# Patient Record
Sex: FEMALE | Race: OTHER | HISPANIC OR LATINO | Employment: FULL TIME | URBAN - METROPOLITAN AREA
[De-identification: names, ages, dates, MRNs, and addresses within clinical notes are randomized per-mention and may not be internally consistent; named-entity substitution may affect disease eponyms.]

---

## 2017-05-29 PROBLEM — F32.A ANXIETY AND DEPRESSION: Status: ACTIVE | Noted: 2017-05-29

## 2017-05-29 PROBLEM — F41.9 ANXIETY AND DEPRESSION: Status: ACTIVE | Noted: 2017-05-29

## 2017-05-29 PROBLEM — E03.9 HYPOTHYROIDISM (ACQUIRED): Status: ACTIVE | Noted: 2017-05-29

## 2017-07-20 PROBLEM — I10 ESSENTIAL HYPERTENSION: Status: ACTIVE | Noted: 2017-07-20

## 2019-08-28 PROBLEM — K80.20 CALCULUS OF GALLBLADDER WITHOUT CHOLECYSTITIS WITHOUT OBSTRUCTION: Status: ACTIVE | Noted: 2019-08-28

## 2019-09-12 PROBLEM — K80.20 SYMPTOMATIC CHOLELITHIASIS: Status: ACTIVE | Noted: 2019-09-12

## 2019-09-26 PROBLEM — K80.20 SYMPTOMATIC CHOLELITHIASIS: Status: RESOLVED | Noted: 2019-09-12 | Resolved: 2019-09-26

## 2019-09-26 PROBLEM — K80.20 CALCULUS OF GALLBLADDER WITHOUT CHOLECYSTITIS WITHOUT OBSTRUCTION: Status: RESOLVED | Noted: 2019-08-28 | Resolved: 2019-09-26

## 2020-03-12 ENCOUNTER — CLINICAL SUPPORT (OUTPATIENT)
Dept: REHABILITATION | Facility: HOSPITAL | Age: 40
End: 2020-03-12
Payer: COMMERCIAL

## 2020-03-12 DIAGNOSIS — M54.9 BACK PAIN, UNSPECIFIED BACK LOCATION, UNSPECIFIED BACK PAIN LATERALITY, UNSPECIFIED CHRONICITY: ICD-10-CM

## 2020-03-12 DIAGNOSIS — R26.89 IMPAIRED GAIT AND MOBILITY: ICD-10-CM

## 2020-03-12 DIAGNOSIS — M53.82 IMPAIRED RANGE OF MOTION OF CERVICAL SPINE: ICD-10-CM

## 2020-03-12 DIAGNOSIS — M54.2 NECK PAIN: ICD-10-CM

## 2020-03-12 PROCEDURE — 97110 THERAPEUTIC EXERCISES: CPT | Mod: PO | Performed by: PHYSICAL THERAPIST

## 2020-03-12 PROCEDURE — 97161 PT EVAL LOW COMPLEX 20 MIN: CPT | Mod: PO | Performed by: PHYSICAL THERAPIST

## 2020-03-12 NOTE — PLAN OF CARE
[facility]  Physical Therapy Initial Evaluation    Name: Caro Torres  Clinic Number: [id number]    Therapy Diagnosis:   Encounter Diagnoses   Name Primary?    Neck pain     Back pain, unspecified back location, unspecified back pain laterality, unspecified chronicity     Impaired gait and mobility     Impaired range of motion of cervical spine      Physician: Marta Navarro MD    Physician Orders:Eval and treat  Medical Diagnosis from Referral:   M54.2 (ICD-10-CM) - Neck pain   M54.9 (ICD-10-CM) - Back pain, unspecified back location, unspecified back pain laterality, unspecified chronicity     Evaluation Date: 3/12/2020  Authorization Period Expiration: 12/31/2020  Plan of Care Expiration: 04/22/2020  Visit # / Visits authorized:1/ 20    Time In: 1405  Time Out: 1455  Total Billable Time: 40 minutes    Precautions: Standard and Immunosuppression    Subjective   Date of onset: 6 mo  History of current condition - Juanita reports: chronic with acute flare ups of neck and R hip pain. She does report wt gain. 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 She has trouble walking after sitting for a while, neck pain L>R, L hand dominant.     Medical History:   Past Medical History:   Diagnosis Date    Anxiety     Gall stone     Hypertension     Hypothyroidism        Surgical History:   Caro Torres  has a past surgical history that includes Chavies tooth extraction (2007) and Laparoscopic cholecystectomy (N/A, 9/12/2019).    Medications:   Caro has a current medication list which includes the following prescription(s): atenolol, bupropion, fluticasone propionate, hydrochlorothiazide, and levothyroxine, and the following Facility-Administered Medications: lidocaine (pf) 10 mg/ml (1%).    Allergies:   Review of patient's allergies indicates:  No Known Allergies     Imaging, none:     Prior Therapy: none  Social History: lives with their family  Occupation:   "manager  Prior Level of Function: Always with tension pain in upper body, hip pain is new  Current Level of Function: She has trouble walking after sitting for a while, neck pain L>R, L hand dominant.    Pain:  Current 4/10, worst 6/10, best 2/10   Location: bilateral neck and R hip   Description: Aching  Aggravating Factors: Sitting, Standing and Walking  Easing Factors: rest    Pts goals: dec pain and learn home management, body awareness/mechanics with work    Objective     Observation: FHP, RS    Posture: fair-good    Cervical Range of Motion:    Degrees Pain   Flexion 34 Pain L shoulder, mid trap area     Extension 48 No complaints     Right Rotation 57 No complaint     Left Rotation 61 painful     Right Side Bending 32 Tight, uncomfortable   Left Side Bending 32 Tight, uncomfortable      Shoulder Range of Motion:   Shoulder Left Right   Flexion WNL WNL   Abduction WNL WNL   ER WNL WNL   IR WNL WNL     Upper Extremity Strength  (R) UE  (L) UE    Shoulder flexion: 5/5 Shoulder flexion: 4/5   Shoulder Abduction: 5/5 Shoulder abduction: 4/5   Shoulder ER 5/5 Shoulder ER 4/5   Shoulder IR 5/5 Shoulder IR 4/5   Lower Trap 4+/5 Lower Trap 4/5   Middle Trap 5/5 Middle Trap 4/5   Rhomboids 4+/5 Rhomboids 4/5     Special Tests:  Distraction Pos for better no worse   Compression worse   Spurlings worse     Palpation: POS FOR TIGHTNESS TO pt and PT    Sensation: intact B UEs/LEs      CMS Impairment/Limitation/Restriction for FOTO  NECK Survey    Therapist reviewed FOTO scores for Caro Torres on 3/12/2020.   FOTO documents entered into [employer] - see Media section.    Limitation Score: 36%         TREATMENT   Treatment Time In: 1430  Treatment Time Out: 1438  Total Treatment time separate from Evaluation: 08 minutes    Juanita received therapeutic exercises to develop for  minutes including:    UT stretch 3x30" B  Slouch over correct 2x10  Chin tuck 2x10    Home Exercises and Patient Education Provided    Education " provided:   - HEP  - Pt/family was provided educational information, including: role of PT, role of pt/caregiver, goals for PT, POC, scheduling, and attendance policy.- pt verbalized understanding.    Written Home Exercises Provided: yes.  Exercises were reviewed and Juanita was able to demonstrate them prior to the end of the session.  Juanita demonstrated good  understanding of the education provided.     See EMR under Patient Instructions for exercises provided 3/12/2020.    Assessment   Caro is a 39 y.o. female referred to outpatient Physical Therapy with a medical diagnosis of   M54.2 (ICD-10-CM) - Neck pain   M54.9 (ICD-10-CM) - Back pain, unspecified back location, unspecified back pain laterality, unspecified chronicity   . Pt presents with forward head, RS posture with fair-good posture. Limited cervical AROM with R rot and B lat flex is noted. Impaired UE L>R strength is also noted. (Pt is L hand dominant.)    Pt prognosis is Good.   Pt will benefit from skilled outpatient Physical Therapy to address the deficits stated above and in the chart below, provide pt/family education, and to maximize pt's level of independence.     Plan of care discussed with patient: Yes  Pt's spiritual, cultural and educational needs considered and patient is agreeable to the plan of care and goals as stated below:     Anticipated Barriers for therapy: none    Medical Necessity is demonstrated by the following  History  Co-morbidities and personal factors that may impact the plan of care Co-morbidities:   anxiety    Personal Factors:   no deficits     low   Examination  Body Structures and Functions, activity limitations and participation restrictions that may impact the plan of care Body Regions:   neck    Body Systems:    ROM  strength  motor control  motor learning    Participation Restrictions:   Looking, head turning, posture deficit    Activity limitations:   Learning and applying knowledge  no deficits    General Tasks  and Commands  no deficits    Communication  no deficits    Mobility  driving (bike, car, motorcycle)    Self care  no deficits    Domestic Life  doing house work (cleaning house, washing dishes, laundry)    Interactions/Relationships  no deficits    Life Areas  no deficits    Community and Social Life  no deficits         moderate   Clinical Presentation stable and uncomplicated low   Decision Making/ Complexity Score: low     Goals:  Short Term Goals: 3 weeks   -Pt will have 60+ deg R AROM cervical rot to improve driving and head turning.  -Improve L shoulder strength to grossly 4+/5.  -Improve FOTO impairment score to 32% for improved QOL.    Long Term Goals: 6  weeks   -Improve L shoulder strength to grossly 5/5.  -Improve R shoulder strength grossly to 5/5.  -Improve FOTO impairment score to 27% for improved QOL.    Plan   Plan of care Certification: 3/12/2020 to 04/22/2020.    Outpatient Physical Therapy 2 times weekly for 6 weeks to include the following interventions: Electrical Stimulation IFC, Manual Therapy, Moist Heat/ Ice, Patient Education, Self Care, Therapeutic Activites and Therapeutic Exercise.     Elizabeth Tolliver, PT

## 2020-03-17 ENCOUNTER — CLINICAL SUPPORT (OUTPATIENT)
Dept: REHABILITATION | Facility: HOSPITAL | Age: 40
End: 2020-03-17
Payer: COMMERCIAL

## 2020-03-17 DIAGNOSIS — M53.82 IMPAIRED RANGE OF MOTION OF CERVICAL SPINE: ICD-10-CM

## 2020-03-17 DIAGNOSIS — R26.89 IMPAIRED GAIT AND MOBILITY: ICD-10-CM

## 2020-03-17 PROCEDURE — 97140 MANUAL THERAPY 1/> REGIONS: CPT | Mod: PO | Performed by: PHYSICAL THERAPIST

## 2020-03-17 PROCEDURE — 97110 THERAPEUTIC EXERCISES: CPT | Mod: PO | Performed by: PHYSICAL THERAPIST

## 2020-03-17 NOTE — PROGRESS NOTES
"  Physical Therapy Daily Treatment Note     Name: Caro Torres  Clinic Number: [id number]    Therapy Diagnosis:   Encounter Diagnoses   Name Primary?    Impaired gait and mobility     Impaired range of motion of cervical spine      Physician: Marta Navarro MD    Visit Date: 3/17/2020  Physician Orders:Eval and treat  Medical Diagnosis from Referral:   M54.2 (ICD-10-CM) - Neck pain   M54.9 (ICD-10-CM) - Back pain, unspecified back location, unspecified back pain laterality, unspecified chronicity      Evaluation Date: 3/12/2020  Authorization Period Expiration: 12/31/2020  Plan of Care Expiration: 04/22/2020  Visit # / Visits authorized: 2/ 20    Time In: 900  Time Out: 1000  Total Billable Time: 55 minutes    Precautions: Standard and Immunosuppression    Subjective     Pt reports: compliance with HEP. Tight neck L sided and R sided hip pain. She loves the hip and UT stretches.  She was compliant with home exercise program.  Response to previous treatment: no adverse effect  Functional change: too early    Pain: 5/10 pre tx (both) and 4/10 post tx (neck only)  Location: R hip, L neck    Objective     Juanita received therapeutic exercises to develop strength, ROM, flexibility and posture for 40 minutes including:    Stretches 3x30":  UT  Levator  Vicky   Supine HS and ITB with strap  Piriformis fig 4 and hook    Strength and posture:  Seated chin tuck 2x10  Slouch over 2x10    Juanita received the following manual therapy techniques: Myofacial release and Soft tissue Mobilization were applied to the: L neck and R hip for 5 and 10 minutes (15 mins total), including:  L cervical STM to UT, rhomboids, and levator  L UT release near scap spine    Rolling to L hip    Home Exercises Provided and Patient Education Provided     Education provided:   - cont HEP.  - additional UT stretch education  - posture, sitting    Written Home Exercises Provided: Patient instructed to cont prior HEP.  Exercises were reviewed and " Juanita was able to demonstrate them prior to the end of the session.  Juanita demonstrated good  understanding of the education provided.     See EMR under Patient Instructions for exercises provided 03/12/20.    Assessment     Juanita needed some correction with UT stretch as she was pushing through pain. She tolerated tx to both neck and R hip. Juanita reports decreased pain. Cont flexibility at this time and progress neck, core, and hip strength once flexibility is achieved.    Juanita is progressing well towards her goals.   Pt prognosis is Good.     Pt will continue to benefit from skilled outpatient physical therapy to address the deficits listed in the problem list box on initial evaluation, provide pt/family education and to maximize pt's level of independence in the home and community environment.     Pt's spiritual, cultural and educational needs considered and pt agreeable to plan of care and goals.    Anticipated barriers to physical therapy: none    Goals:   Short Term Goals: 3 weeks, progressing   -Pt will have 60+ deg R AROM cervical rot to improve driving and head turning.  -Improve L shoulder strength to grossly 4+/5.  -Improve FOTO impairment score to 32% for improved QOL.     Long Term Goals: 6  weeks, progressing   -Improve L shoulder strength to grossly 5/5.  -Improve R shoulder strength grossly to 5/5.  -Improve FOTO impairment score to 27% for improved QOL.       Plan     Cont flexibility at this time and progress neck, core, and hip strength once flexibility is achieved.    Elizabeth Tolliver, PT

## 2020-03-19 ENCOUNTER — CLINICAL SUPPORT (OUTPATIENT)
Dept: REHABILITATION | Facility: HOSPITAL | Age: 40
End: 2020-03-19
Payer: COMMERCIAL

## 2020-03-19 DIAGNOSIS — M53.82 IMPAIRED RANGE OF MOTION OF CERVICAL SPINE: ICD-10-CM

## 2020-03-19 DIAGNOSIS — R26.89 IMPAIRED GAIT AND MOBILITY: ICD-10-CM

## 2020-03-19 PROCEDURE — 97140 MANUAL THERAPY 1/> REGIONS: CPT | Mod: PO | Performed by: PHYSICAL THERAPIST

## 2020-03-19 PROCEDURE — 97110 THERAPEUTIC EXERCISES: CPT | Mod: PO | Performed by: PHYSICAL THERAPIST

## 2020-03-19 NOTE — PROGRESS NOTES
"  Physical Therapy Daily Treatment Note     Name: Caro Torres  Clinic Number: [id number]    Therapy Diagnosis:   Encounter Diagnoses   Name Primary?    Impaired gait and mobility     Impaired range of motion of cervical spine      Physician: Marta Navarro MD    Visit Date: 3/19/2020  Physician Orders:Eval and treat  Medical Diagnosis from Referral:   M54.2 (ICD-10-CM) - Neck pain   M54.9 (ICD-10-CM) - Back pain, unspecified back location, unspecified back pain laterality, unspecified chronicity      Evaluation Date: 3/12/2020  Authorization Period Expiration: 12/31/2020  Plan of Care Expiration: 04/22/2020  Visit # / Visits authorized: 3/ 20    Time In: 900  Time Out: 1000  Total Billable Time: 56 minutes    Precautions: Standard and Immunosuppression    Subjective     Pt reports: compliance with HEP. Tight neck L sided, hip doing well-only hip pain with sit to stand. Needs refresher on new ex from last session.  She was compliant with home exercise program.  Response to previous treatment: no adverse effect  Functional change:     Pain: 4/10 pre tx (both) and 3/10 post tx (neck only)  Location: R hip, L neck    Objective     Juanita received therapeutic exercises to develop strength, ROM, flexibility and posture for 45 minutes including:    Stretches 3x30":  UT  Levator  Vicky   Supine HS and ITB with strap  Piriformis fig 4 and hook    Strength and posture:  Seated chin tuck 3x10  Slouch over 3x10    Juanita received the following manual therapy techniques: Myofacial release and Soft tissue Mobilization were applied to the: L neck for 10 and R hip for 05 minutes (total 15 mins), including:  L cervical STM to UT, rhomboids, and levator  L UT release near scap spine    R hip mobs grade II-III    Home Exercises Provided and Patient Education Provided     Education provided:   - cont HEP.  - additional HEP  - posture, sitting    Written Home Exercises Provided: yes.  Exercises were reviewed and Juanita was able to " demonstrate them prior to the end of the session.  Juanita demonstrated good  understanding of the education provided.     See EMR under Patient Instructions for exercises provided 3/19/20.    Assessment     Juanita continues progressing in gradual relief. Additional home program given and will cont 1x/week at this time given the recommendations concerning Coronavirus. Cont flexibility at this time and progress neck, core, and hip strength once flexibility is achieved.    Spoke with patient due to therapy following updates regarding COVID-19 closely and taking every precaution to ensure the safety of our patients, staff and community.  In an abundance of caution and in an effort to help reduce risk and limit community spread, we have decided to temporarily postpone appointments for patients who may be at increased risk to attend in-person therapy or where therapy is not critically needed at this time. Plan of care and home exercise program were reviewed and patient has what they need to continue therapy at home. Will cont 1x/week for manual therapy and progressions needed. All patient questions were answered. Also stated to patient that we are exploring virtual methods of providing care and will be in touch over the next few weeks. Patient verbalized understanding to all.      Juanita is progressing well towards her goals.   Pt prognosis is Good.     Pt will continue to benefit from skilled outpatient physical therapy to address the deficits listed in the problem list box on initial evaluation, provide pt/family education and to maximize pt's level of independence in the home and community environment.     Pt's spiritual, cultural and educational needs considered and pt agreeable to plan of care and goals.    Anticipated barriers to physical therapy: none    Goals:   Short Term Goals: 3 weeks, progressing   -Pt will have 60+ deg R AROM cervical rot to improve driving and head turning.  -Improve L shoulder strength to  grossly 4+/5.  -Improve FOTO impairment score to 32% for improved QOL.     Long Term Goals: 6  weeks, progressing   -Improve L shoulder strength to grossly 5/5.  -Improve R shoulder strength grossly to 5/5.  -Improve FOTO impairment score to 27% for improved QOL.       Plan     Cont flexibility at this time and progress neck, core, and hip strength once flexibility is achieved.    Elizabeth Tolliver, PT

## 2020-03-23 ENCOUNTER — DOCUMENTATION ONLY (OUTPATIENT)
Dept: REHABILITATION | Facility: HOSPITAL | Age: 40
End: 2020-03-23

## 2020-04-01 ENCOUNTER — TELEPHONE (OUTPATIENT)
Dept: REHABILITATION | Facility: HOSPITAL | Age: 40
End: 2020-04-01

## 2020-05-05 ENCOUNTER — DOCUMENTATION ONLY (OUTPATIENT)
Dept: REHABILITATION | Facility: HOSPITAL | Age: 40
End: 2020-05-05

## 2020-05-05 ENCOUNTER — TELEPHONE (OUTPATIENT)
Dept: REHABILITATION | Facility: HOSPITAL | Age: 40
End: 2020-05-05

## 2020-05-05 PROBLEM — R26.89 IMPAIRED GAIT AND MOBILITY: Status: RESOLVED | Noted: 2020-03-12 | Resolved: 2020-05-05

## 2020-05-05 PROBLEM — M53.82 IMPAIRED RANGE OF MOTION OF CERVICAL SPINE: Status: RESOLVED | Noted: 2020-03-12 | Resolved: 2020-05-05

## 2020-05-05 NOTE — PROGRESS NOTES
Outpatient Therapy Discharge Summary     Name: Caro Torres  Clinic Number: [id number]    Therapy Diagnosis:        Encounter Diagnoses   Name Primary?    Impaired gait and mobility      Impaired range of motion of cervical spine        Physician: Marta Navarro MD    Physician Orders:Eval and treat  Medical Diagnosis from Referral:   M54.2 (ICD-10-CM) - Neck pain   M54.9 (ICD-10-CM) - Back pain, unspecified back location, unspecified back pain laterality, unspecified chronicity      Evaluation Date: 3/12/2020    Date of Last visit: 03/12/2020  Total Visits Received: 3  Cancelled Visits: 0  No Show Visits: 0    Assessment    Goals:   Short Term Goals: 3 weeks, progressing   -Pt will have 60+ deg R AROM cervical rot to improve driving and head turning.  -Improve L shoulder strength to grossly 4+/5.  -Improve FOTO impairment score to 32% for improved QOL.     Long Term Goals: 6  weeks, progressing   -Improve L shoulder strength to grossly 5/5.  -Improve R shoulder strength grossly to 5/5.  -Improve FOTO impairment score to 27% for improved QOL.    Unable to assess goals as therapy was placed on hold due to COVID-19 concerns. Pt is now feeling much better and ready to be discharged to [facility].       Discharge reason: Patient is now asymptomatic.    Plan   This patient is discharged from Physical Therapy.

## 2021-03-11 ENCOUNTER — IMMUNIZATION (OUTPATIENT)
Dept: FAMILY MEDICINE | Facility: CLINIC | Age: 41
End: 2021-03-11
Payer: COMMERCIAL

## 2021-03-11 DIAGNOSIS — Z23 NEED FOR VACCINATION: Primary | ICD-10-CM

## 2021-03-11 PROCEDURE — 91300 COVID-19, MRNA, LNP-S, PF, 30 MCG/0.3 ML DOSE VACCINE: CPT | Mod: PBBFAC | Performed by: FAMILY MEDICINE

## 2021-04-01 ENCOUNTER — IMMUNIZATION (OUTPATIENT)
Dept: FAMILY MEDICINE | Facility: CLINIC | Age: 41
End: 2021-04-01
Payer: COMMERCIAL

## 2021-04-01 DIAGNOSIS — Z23 NEED FOR VACCINATION: Primary | ICD-10-CM

## 2021-04-01 PROCEDURE — 91300 COVID-19, MRNA, LNP-S, PF, 30 MCG/0.3 ML DOSE VACCINE: CPT | Mod: PBBFAC | Performed by: FAMILY MEDICINE

## 2021-04-01 PROCEDURE — 0002A COVID-19, MRNA, LNP-S, PF, 30 MCG/0.3 ML DOSE VACCINE: CPT | Mod: PBBFAC | Performed by: FAMILY MEDICINE

## 2022-02-23 PROBLEM — R93.89 ABNORMAL THYROID ULTRASOUND: Status: ACTIVE | Noted: 2022-02-23

## 2022-03-02 PROBLEM — E04.1 THYROID NODULE: Status: ACTIVE | Noted: 2022-03-02

## 2023-10-22 PROBLEM — H05.20 EXOPHTHALMOS: Status: ACTIVE | Noted: 2023-10-22

## 2023-10-22 PROBLEM — H01.009 BLEPHARITIS: Status: ACTIVE | Noted: 2023-10-22

## 2024-07-11 PROBLEM — H01.009 BLEPHARITIS: Status: RESOLVED | Noted: 2023-10-22 | Resolved: 2024-07-11

## 2025-02-09 ENCOUNTER — OFFICE VISIT (OUTPATIENT)
Dept: URGENT CARE | Facility: CLINIC | Age: 45
End: 2025-02-09
Payer: COMMERCIAL

## 2025-02-09 VITALS
WEIGHT: 209 LBS | TEMPERATURE: 100 F | DIASTOLIC BLOOD PRESSURE: 90 MMHG | BODY MASS INDEX: 39.46 KG/M2 | HEART RATE: 94 BPM | HEIGHT: 61 IN | OXYGEN SATURATION: 97 % | SYSTOLIC BLOOD PRESSURE: 142 MMHG | RESPIRATION RATE: 17 BRPM

## 2025-02-09 DIAGNOSIS — R50.9 FEVER, UNSPECIFIED FEVER CAUSE: ICD-10-CM

## 2025-02-09 DIAGNOSIS — U07.1 COVID-19 VIRUS INFECTION: Primary | ICD-10-CM

## 2025-02-09 DIAGNOSIS — U07.1 COVID-19 VIRUS DETECTED: ICD-10-CM

## 2025-02-09 LAB
CTP QC/QA: YES
SARS CORONAVIRUS 2 ANTIGEN: POSITIVE

## 2025-02-09 PROCEDURE — 99203 OFFICE O/P NEW LOW 30 MIN: CPT | Mod: S$GLB,,, | Performed by: NURSE PRACTITIONER

## 2025-02-09 PROCEDURE — 87811 SARS-COV-2 COVID19 W/OPTIC: CPT | Mod: QW,S$GLB,, | Performed by: NURSE PRACTITIONER

## 2025-02-09 RX ORDER — BENZONATATE 200 MG/1
200 CAPSULE ORAL 3 TIMES DAILY PRN
Qty: 30 CAPSULE | Refills: 0 | Status: SHIPPED | OUTPATIENT
Start: 2025-02-09

## 2025-02-09 RX ORDER — BENZONATATE 200 MG/1
200 CAPSULE ORAL 3 TIMES DAILY PRN
Qty: 30 CAPSULE | Refills: 0 | Status: SHIPPED | OUTPATIENT
Start: 2025-02-09 | End: 2025-02-09

## 2025-02-09 NOTE — PATIENT INSTRUCTIONS

## 2025-02-09 NOTE — PROGRESS NOTES
"Subjective:      Patient ID: Caro Torres is a 44 y.o. female.    Vitals:  height is 5' 1" (1.549 m) and weight is 94.8 kg (209 lb). Her oral temperature is 100.2 °F (37.9 °C). Her blood pressure is 151/96 (abnormal) and her pulse is 94. Her respiration is 17 and oxygen saturation is 97%.     Chief Complaint: Fever    Pt stated that she tested positive for covid last night. Pt stated that she is having fever, headache, nausea, vomiting, and tingling in the legs since yesterday. Pain 06/10    Fever   This is a new problem. The current episode started 1 day ago. The problem has been unchanged. Associated symptoms include congestion, coughing, headaches, nausea and vomiting. Pertinent negatives include no chest pain, diarrhea, ear pain, rash or sore throat.       Constitution: Positive for fever. Negative for chills, sweating and fatigue.   HENT:  Positive for congestion. Negative for ear pain, facial swelling and sore throat.    Neck: Negative for painful lymph nodes.   Cardiovascular: Negative.  Negative for chest trauma, chest pain and sob on exertion.   Eyes: Negative.  Negative for eye itching and eye pain.   Respiratory:  Positive for cough. Negative for chest tightness and asthma.    Gastrointestinal:  Positive for nausea and vomiting. Negative for diarrhea.   Endocrine: negative. cold intolerance and excessive thirst.   Genitourinary: Negative.  Negative for dysuria, frequency, urgency and hematuria.   Musculoskeletal:  Negative for pain, trauma and joint pain.   Skin: Negative.  Negative for rash, wound and hives.   Allergic/Immunologic: Negative.  Negative for eczema, asthma, hives and itching.   Neurological:  Positive for headaches. Negative for disorientation and altered mental status.   Hematologic/Lymphatic: Negative.  Negative for swollen lymph nodes.   Psychiatric/Behavioral: Negative.  Negative for altered mental status, disorientation and confusion.       Objective:     Physical Exam "   Constitutional: She is oriented to person, place, and time. She appears well-developed. She is cooperative.  Non-toxic appearance. She does not appear ill. No distress.   HENT:   Head: Normocephalic and atraumatic.   Ears:   Right Ear: Hearing normal.   Left Ear: Hearing normal.   Nose: Nose normal. No mucosal edema or nasal deformity. No epistaxis. Right sinus exhibits no maxillary sinus tenderness and no frontal sinus tenderness. Left sinus exhibits no maxillary sinus tenderness and no frontal sinus tenderness.   Mouth/Throat: Uvula is midline, oropharynx is clear and moist and mucous membranes are normal. No trismus in the jaw. Normal dentition. No uvula swelling. No posterior oropharyngeal edema.   Eyes: Conjunctivae and lids are normal. No scleral icterus.   Neck: Trachea normal and phonation normal. Neck supple. No edema present. No erythema present.   Cardiovascular: Normal rate, regular rhythm, normal heart sounds and normal pulses.   Pulmonary/Chest: Effort normal and breath sounds normal. No stridor. No respiratory distress. She has no decreased breath sounds. She has no wheezes. She has no rhonchi. She has no rales. She exhibits no tenderness.   Abdominal: Normal appearance.   Musculoskeletal: Normal range of motion.         General: No deformity. Normal range of motion.   Neurological: no focal deficit. She is alert, oriented to person, place, and time and at baseline. She exhibits normal muscle tone. Coordination normal.   Skin: Skin is warm, dry, intact, not diaphoretic and not pale.   Psychiatric: Her speech is normal and behavior is normal. Mood, judgment and thought content normal.   Nursing note and vitals reviewed.    Results for orders placed or performed in visit on 02/09/25   SARS Coronavirus 2 Antigen, POCT Manual Read    Collection Time: 02/09/25 11:26 AM   Result Value Ref Range    SARS Coronavirus 2 Antigen Positive (A) Negative, Presumptive Negative     Acceptable Yes           Assessment:     1. COVID-19 virus infection    2. Fever, unspecified fever cause        Plan:   + Covid 19 virus, patient agreed to take Molnupiravir for tx.      All hx was provided by the pt or available as part of established EMR. The pt past medical hx, family hx, social hx, and current medications were reviewed. Interpretation of diagnostics performed today were discussed. Tx discussed. Quarantine recommendations based on CDC guidelines discussed. Pt may follow up with [facility] for any concern. ER precautions. Pt voiced understanding of all discussed, and agreed.    Some portions of the physical exam were omitted to reduce chances of viral infection transmission.           FOLLOWUP  Follow up if symptoms worsen or fail to improve, for PLEASE CONTACT PCP OR CONTACT THE EMERGENCY ROOM..     PATIENT INSTRUCTIONS  Patient Instructions   INSTRUCTIONS:  - Rest.  - Drink plenty of fluids.  - Take Tylenol and/or Ibuprofen as directed as needed for fever/pain.  Do not take more than the recommended dose.  - follow up with your PCP within the next 1-2 weeks as needed.  - You must understand that you have received an Urgent Care treatment only and that you may be released before all of your medical problems are known or treated.   - You, the patient, will arrange for follow up care as instructed.   - If your condition worsens or fails to improve we recommend that you receive another evaluation at the ER immediately or contact your PCP to discuss your concerns.   - You can call [phone] or [phone] to help schedule an appointment with the appropriate provider.     -If you smoke cigarettes, it would be beneficial for you to stop.         THANK YOU FOR ALLOWING ME TO PARTICIPATE IN YOUR HEALTHCARE,     Mateus Andre, ANTHONY     COVID-19 virus infection  -     molnupiravir 200 mg capsule (EUA); Take 4 capsules (800 mg total) by mouth every 12 (twelve) hours. for 5 days  Dispense: 40 capsule; Refill: 0  -      benzonatate (TESSALON) 200 MG capsule; Take 1 capsule (200 mg total) by mouth 3 (three) times daily as needed for Cough.  Dispense: 30 capsule; Refill: 0    Fever, unspecified fever cause  -     SARS Coronavirus 2 Antigen, POCT Manual Read

## 2025-07-22 ENCOUNTER — OFFICE VISIT (OUTPATIENT)
Dept: PSYCHIATRY | Facility: CLINIC | Age: 45
End: 2025-07-22
Payer: COMMERCIAL

## 2025-07-22 ENCOUNTER — TELEPHONE (OUTPATIENT)
Dept: PSYCHIATRY | Facility: CLINIC | Age: 45
End: 2025-07-22
Payer: COMMERCIAL

## 2025-07-22 DIAGNOSIS — F41.9 ANXIETY AND DEPRESSION: ICD-10-CM

## 2025-07-22 DIAGNOSIS — F32.A ANXIETY AND DEPRESSION: ICD-10-CM

## 2025-07-22 PROCEDURE — 1159F MED LIST DOCD IN RCRD: CPT | Mod: CPTII,95,, | Performed by: SOCIAL WORKER

## 2025-07-22 PROCEDURE — 90832 PSYTX W PT 30 MINUTES: CPT | Mod: 95,,, | Performed by: SOCIAL WORKER

## 2025-07-22 NOTE — PROGRESS NOTES
Individual Psychotherapy (PhD/LCSW)    7/22/2025    Virtual Visit:  ROSEMARY Silva  [phone] m    Site:  [facility], [place]         Therapeutic Intervention: Met with patient.  Outpatient - Supportive psychotherapy 30 min - CPT Code 57662    Chief complaint/reason for encounter: depression, anxiety, and interpersonal     Interval history and content of current session: Patient was seen virtually this date. She was scheduled for a new Patient appointment. During the course of the evaluation, she reported she is the daughter of another patient Counselor is currently actively working with; as a result, Counselor informed due to the conflict of interest, it is Patient's best interest to be seen by another provider. Patient expressed understanding.      Treatment plan:  Target symptoms: depression, anxiety   Why chosen therapy is appropriate versus another modality: patient responds to this modality  Outcome monitoring methods: self-report  Therapeutic intervention type: supportive psychotherapy    Risk parameters:  Patient reports no suicidal ideation  Patient reports no homicidal ideation  Patient reports no self-injurious behavior  Patient reports no violent behavior    Verbal deficits: None    Patient's response to intervention:  The patient's response to intervention is accepting.    Progress toward goals and other mental status changes:  The patient's progress toward goals is fair .    Diagnosis:     ICD-10-CM ICD-9-CM   1. Anxiety and depression  F41.9 300.00    F32.A 311       Plan:  individual psychotherapy and medication management by physician    Return to clinic: Patient will be referred due to conflict with Counselor    Length of Service (minutes): 30

## 2025-07-22 NOTE — TELEPHONE ENCOUNTER
I called pt to schedule a new pt therapy appt with another therapist due to conflict of interest. I scheduled pt  with Marylin . I will try to call again to see if that appt is acceptable.

## 2025-07-28 ENCOUNTER — PATIENT MESSAGE (OUTPATIENT)
Dept: PSYCHIATRY | Facility: CLINIC | Age: 45
End: 2025-07-28
Payer: COMMERCIAL

## 2025-07-29 ENCOUNTER — OFFICE VISIT (OUTPATIENT)
Dept: PSYCHIATRY | Facility: CLINIC | Age: 45
End: 2025-07-29
Payer: COMMERCIAL

## 2025-07-29 DIAGNOSIS — F41.9 ANXIETY AND DEPRESSION: Primary | ICD-10-CM

## 2025-07-29 DIAGNOSIS — F32.A ANXIETY AND DEPRESSION: Primary | ICD-10-CM

## 2025-07-29 PROCEDURE — 90791 PSYCH DIAGNOSTIC EVALUATION: CPT | Mod: S$GLB,,, | Performed by: SOCIAL WORKER
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
## 2025-08-15 ENCOUNTER — OFFICE VISIT (OUTPATIENT)
Dept: PSYCHIATRY | Facility: CLINIC | Age: 45
End: 2025-08-15
Payer: COMMERCIAL

## 2025-08-15 DIAGNOSIS — Z91.49 HISTORY OF PSYCHOLOGICAL TRAUMA: ICD-10-CM

## 2025-08-15 DIAGNOSIS — F43.23 ADJUSTMENT DISORDER WITH MIXED ANXIETY AND DEPRESSED MOOD: Primary | ICD-10-CM

## 2025-08-15 PROCEDURE — 90834 PSYTX W PT 45 MINUTES: CPT | Mod: S$GLB,,, | Performed by: SOCIAL WORKER
